# Patient Record
Sex: FEMALE | Race: WHITE | NOT HISPANIC OR LATINO
[De-identification: names, ages, dates, MRNs, and addresses within clinical notes are randomized per-mention and may not be internally consistent; named-entity substitution may affect disease eponyms.]

---

## 2020-07-16 PROBLEM — Z00.00 ENCOUNTER FOR PREVENTIVE HEALTH EXAMINATION: Status: ACTIVE | Noted: 2020-07-16

## 2020-07-30 ENCOUNTER — APPOINTMENT (OUTPATIENT)
Dept: OPHTHALMOLOGY | Facility: CLINIC | Age: 75
End: 2020-07-30
Payer: MEDICARE

## 2020-07-30 ENCOUNTER — NON-APPOINTMENT (OUTPATIENT)
Age: 75
End: 2020-07-30

## 2020-07-30 PROCEDURE — 92004 COMPRE OPH EXAM NEW PT 1/>: CPT

## 2020-09-10 ENCOUNTER — APPOINTMENT (OUTPATIENT)
Dept: OPHTHALMOLOGY | Facility: CLINIC | Age: 75
End: 2020-09-10
Payer: MEDICARE

## 2020-09-10 ENCOUNTER — NON-APPOINTMENT (OUTPATIENT)
Age: 75
End: 2020-09-10

## 2020-09-10 PROCEDURE — 92012 INTRM OPH EXAM EST PATIENT: CPT

## 2021-05-27 ENCOUNTER — APPOINTMENT (OUTPATIENT)
Dept: OPHTHALMOLOGY | Facility: CLINIC | Age: 76
End: 2021-05-27
Payer: MEDICARE

## 2021-05-27 ENCOUNTER — NON-APPOINTMENT (OUTPATIENT)
Age: 76
End: 2021-05-27

## 2021-05-27 PROCEDURE — 92012 INTRM OPH EXAM EST PATIENT: CPT

## 2021-07-12 ENCOUNTER — NON-APPOINTMENT (OUTPATIENT)
Age: 76
End: 2021-07-12

## 2021-07-12 ENCOUNTER — APPOINTMENT (OUTPATIENT)
Dept: OPHTHALMOLOGY | Facility: CLINIC | Age: 76
End: 2021-07-12
Payer: MEDICARE

## 2021-07-12 PROCEDURE — 92083 EXTENDED VISUAL FIELD XM: CPT

## 2021-07-12 PROCEDURE — 92134 CPTRZ OPH DX IMG PST SGM RTA: CPT

## 2021-07-12 PROCEDURE — 92014 COMPRE OPH EXAM EST PT 1/>: CPT

## 2022-02-22 ENCOUNTER — NON-APPOINTMENT (OUTPATIENT)
Age: 77
End: 2022-02-22

## 2022-02-22 ENCOUNTER — APPOINTMENT (OUTPATIENT)
Dept: OPHTHALMOLOGY | Facility: CLINIC | Age: 77
End: 2022-02-22
Payer: MEDICARE

## 2022-02-22 PROCEDURE — 92012 INTRM OPH EXAM EST PATIENT: CPT

## 2022-03-14 ENCOUNTER — NON-APPOINTMENT (OUTPATIENT)
Age: 77
End: 2022-03-14

## 2022-03-14 ENCOUNTER — APPOINTMENT (OUTPATIENT)
Dept: OPHTHALMOLOGY | Facility: CLINIC | Age: 77
End: 2022-03-14
Payer: MEDICARE

## 2022-03-14 PROCEDURE — 92012 INTRM OPH EXAM EST PATIENT: CPT | Mod: 25

## 2022-03-14 PROCEDURE — 68761 CLOSE TEAR DUCT OPENING: CPT | Mod: E2,E4

## 2022-04-18 ENCOUNTER — APPOINTMENT (OUTPATIENT)
Dept: OPHTHALMOLOGY | Facility: CLINIC | Age: 77
End: 2022-04-18
Payer: MEDICARE

## 2022-04-18 ENCOUNTER — NON-APPOINTMENT (OUTPATIENT)
Age: 77
End: 2022-04-18

## 2022-04-18 PROCEDURE — 92012 INTRM OPH EXAM EST PATIENT: CPT

## 2022-07-19 ENCOUNTER — APPOINTMENT (OUTPATIENT)
Dept: OPHTHALMOLOGY | Facility: CLINIC | Age: 77
End: 2022-07-19

## 2022-07-19 ENCOUNTER — NON-APPOINTMENT (OUTPATIENT)
Age: 77
End: 2022-07-19

## 2022-07-19 PROCEDURE — 92134 CPTRZ OPH DX IMG PST SGM RTA: CPT

## 2022-07-19 PROCEDURE — 92014 COMPRE OPH EXAM EST PT 1/>: CPT

## 2022-07-19 PROCEDURE — 92083 EXTENDED VISUAL FIELD XM: CPT

## 2022-09-01 ENCOUNTER — APPOINTMENT (OUTPATIENT)
Dept: OPHTHALMOLOGY | Facility: CLINIC | Age: 77
End: 2022-09-01

## 2022-09-01 ENCOUNTER — NON-APPOINTMENT (OUTPATIENT)
Age: 77
End: 2022-09-01

## 2022-09-01 PROCEDURE — 68761 CLOSE TEAR DUCT OPENING: CPT | Mod: E2,E4

## 2022-09-01 PROCEDURE — 92012 INTRM OPH EXAM EST PATIENT: CPT | Mod: 25

## 2022-09-19 ENCOUNTER — NON-APPOINTMENT (OUTPATIENT)
Age: 77
End: 2022-09-19

## 2022-09-19 ENCOUNTER — APPOINTMENT (OUTPATIENT)
Dept: OPHTHALMOLOGY | Facility: CLINIC | Age: 77
End: 2022-09-19

## 2022-09-19 PROCEDURE — 92012 INTRM OPH EXAM EST PATIENT: CPT

## 2022-09-22 ENCOUNTER — NON-APPOINTMENT (OUTPATIENT)
Age: 77
End: 2022-09-22

## 2022-09-22 ENCOUNTER — APPOINTMENT (OUTPATIENT)
Dept: OPHTHALMOLOGY | Facility: CLINIC | Age: 77
End: 2022-09-22

## 2022-09-22 DIAGNOSIS — H04.552 ACQUIRED STENOSIS OF LEFT NASOLACRIMAL DUCT: ICD-10-CM

## 2022-09-22 PROCEDURE — 68810 PROBE NASOLACRIMAL DUCT: CPT | Mod: LT

## 2022-09-29 LAB — BACTERIA EYE AEROBE CULT: ABNORMAL

## 2022-11-03 ENCOUNTER — APPOINTMENT (OUTPATIENT)
Dept: RHEUMATOLOGY | Facility: CLINIC | Age: 77
End: 2022-11-03

## 2022-12-09 ENCOUNTER — APPOINTMENT (OUTPATIENT)
Dept: RHEUMATOLOGY | Facility: CLINIC | Age: 77
End: 2022-12-09

## 2023-07-20 ENCOUNTER — APPOINTMENT (OUTPATIENT)
Dept: OPHTHALMOLOGY | Facility: CLINIC | Age: 78
End: 2023-07-20
Payer: MEDICARE

## 2023-07-20 ENCOUNTER — NON-APPOINTMENT (OUTPATIENT)
Age: 78
End: 2023-07-20

## 2023-07-20 PROCEDURE — 92083 EXTENDED VISUAL FIELD XM: CPT

## 2023-07-20 PROCEDURE — 92014 COMPRE OPH EXAM EST PT 1/>: CPT

## 2023-07-20 PROCEDURE — 92134 CPTRZ OPH DX IMG PST SGM RTA: CPT

## 2024-04-09 ENCOUNTER — APPOINTMENT (OUTPATIENT)
Dept: RHEUMATOLOGY | Facility: CLINIC | Age: 79
End: 2024-04-09
Payer: MEDICARE

## 2024-04-09 VITALS
TEMPERATURE: 97.2 F | HEART RATE: 97 BPM | WEIGHT: 120 LBS | DIASTOLIC BLOOD PRESSURE: 117 MMHG | HEIGHT: 60 IN | BODY MASS INDEX: 23.56 KG/M2 | SYSTOLIC BLOOD PRESSURE: 180 MMHG | OXYGEN SATURATION: 93 %

## 2024-04-09 PROCEDURE — 99205 OFFICE O/P NEW HI 60 MIN: CPT

## 2024-04-09 RX ORDER — HYDROCHLOROTHIAZIDE 12.5 MG/1
TABLET ORAL
Refills: 0 | Status: ACTIVE | COMMUNITY

## 2024-04-09 RX ORDER — GABAPENTIN 600 MG/1
600 TABLET, COATED ORAL
Refills: 0 | Status: ACTIVE | COMMUNITY

## 2024-04-09 NOTE — ASSESSMENT
[FreeTextEntry1] : 78 y/o female referred to rheumatology for RA. Pt started to see Dr. Rodrigues in 3/2021 and diagnosed with Sjogren's. Pt was started on HCQ 200mg PO BID with resolution of her joint pains x 1 year. Pt was then diagnosed with RA by another doctor Dr. Ortega and started on MTX 5 tabs/week with folic acid with L hand pain resolution but R hand deformities continuing. Pt had CT chest with diagnosis of pulmonary fibrosis and advised to follow up with rheumatology. Pt was last seen by Dr. Ortega in 12/2023. Pt was seen by pulmonary with abnormal PFTs but normal 6 minute walk test. Last seen by ophthalmology 7/2023.  Patient has clear HX of RA based on pt's R hand deformities. Pt was recently diagnosed with ILD. 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 Either way, pt's MTX has been stopped. Given pt's new onset of R hand deformities and ILD, I believe pt needs more aggressive therapy than HCQ alone, which is what pt is on right now.  - Will obtain pt's labwork and CT chest results over the last 6 months (Obtained and added to history) - c/w HCQ 200mg PO BID. HCQ dosage is <5mg/kg/day or <400mg/day to minimize risk of retinal toxicity. Side effects of HCQ were discussed with the patient including but not limited to GI upset, retinal toxicity, QT prolongation, cytopenias, myopathy. Discussed the importance of yearly ophthalmology evaluation. - Discussed with patient about options for additional treatment including tocilizumab (given pt's RA as well as ILD since tocilizumab has been used for CTD-ILD in past), LEF, SSZ - Last ophthalmology follow up 7/2023. - Continue follow up with pulmonary for follow up of ILD with serial PFTs and 6MWT - Will contact pt about labwork (especially Hep B/C, TB) to see if pt needs additional labwork to be started on any DMARDs. Pt will also call me after reading about the medication options. RTC 3 months for follow up

## 2024-04-09 NOTE — HISTORY OF PRESENT ILLNESS
[FreeTextEntry1] : 80 y/o female referred to rheumatology for RA. Pt started to see Dr. Rodrigues in 3/2021 and diagnosed with Sjogren's. Pt was started on HCQ 200mg PO BID with resolution of her joint pains x 1 year. Pt was then diagnosed with RA by another doctor Dr. Ortega and started on MTX 5 tabs/week with folic acid with L hand pain resolution but R hand deformities continuing. Pt had CT chest with diagnosis of pulmonary fibrosis and advised to follow up with rheumatology. Pt was last seen by Dr. Ortega in 12/2023. Pt was seen by pulmonary with abnormal PFTs but normal 6 minute walk test. Last seen by ophthalmology 7/2023.  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 Mild chronic L4 compression fracture, unchanged.

## 2024-04-09 NOTE — PHYSICAL EXAM
[TextEntry] : GENERAL: Appears in no acute distress  HEENT: EOMI, PERRLA. No conjunctival erythema. Moist mucous membranes. No nasopharyngeal ulcers  NECK: Supple, no cervical lymphadenopathy, no thyromegaly  CARDIOVASCULAR: RRR. S1, S2 auscultated. No murmurs or rubs.  PULMONARY: Clear to auscultation b/l, no wheezes, rales, or crackles  ABDOMINAL: Soft, nontender, nondistended. Bowel sounds present. No organomegaly.  MSK:  No active synovitis, swelling, erythema, or warmth.  R hand ulnar deviation and MCP bony hypertrophy SKIN: No lesions or rashes  NEURO: No focal deficits PSYCH: AAOx3. Normal affect and thought process.

## 2024-05-10 ENCOUNTER — APPOINTMENT (OUTPATIENT)
Dept: RHEUMATOLOGY | Facility: CLINIC | Age: 79
End: 2024-05-10
Payer: MEDICARE

## 2024-05-10 VITALS
TEMPERATURE: 96.9 F | BODY MASS INDEX: 23.56 KG/M2 | OXYGEN SATURATION: 93 % | HEART RATE: 92 BPM | SYSTOLIC BLOOD PRESSURE: 170 MMHG | WEIGHT: 120 LBS | HEIGHT: 60 IN | DIASTOLIC BLOOD PRESSURE: 108 MMHG

## 2024-05-10 DIAGNOSIS — Z79.899 OTHER LONG TERM (CURRENT) DRUG THERAPY: ICD-10-CM

## 2024-05-10 DIAGNOSIS — M06.9 RHEUMATOID ARTHRITIS, UNSPECIFIED: ICD-10-CM

## 2024-05-10 DIAGNOSIS — J84.9 INTERSTITIAL PULMONARY DISEASE, UNSPECIFIED: ICD-10-CM

## 2024-05-10 PROCEDURE — 99214 OFFICE O/P EST MOD 30 MIN: CPT

## 2024-05-10 PROCEDURE — G2211 COMPLEX E/M VISIT ADD ON: CPT

## 2024-05-10 RX ORDER — TOCILIZUMAB 180 MG/ML
162 INJECTION, SOLUTION SUBCUTANEOUS
Qty: 2 | Refills: 5 | Status: ACTIVE | COMMUNITY
Start: 2024-05-10

## 2024-05-10 NOTE — HISTORY OF PRESENT ILLNESS
[FreeTextEntry1] : HISTORY:  80 y/o female presents as follow up for RA. Pt started to see Dr. Rodrigues in 3/2021 and diagnosed with Sjogren's. Pt was started on HCQ 200mg PO BID with resolution of her joint pains x 1 year.  Pt was then diagnosed with RA by another doctor Dr. Ortega and started on MTX 5 tabs/week with folic acid with L hand pain resolution but R hand deformities continuing.  Pt had CT chest with diagnosis of pulmonary fibrosis and advised to follow up with rheumatology.  Pt was last seen by Dr. Ortega in 12/2023. Pt was seen by pulmonary with abnormal PFTs but normal 6 minute walk test.  Last seen by ophthalmology 7/2023.   Patient has clear Hx of RA based on pt's R hand deformities.  Pt was recently diagnosed with ILD. 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 Either way, pt's MTX has been stopped.  Given pt's new onset of R hand deformities and ILD, I believe pt needs more aggressive therapy than HCQ alone, which is what pt is on right now.   INTERVAL HISTORY:  Pt had labwork done with negative safety labwork. Pt discussed with cardiology about tocilizumab and he has positive thoughts on the medication on patients with RA and ILD. Pt has not had chance to discuss with pulmonologist about starting tocilizumab yet.  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 Mild chronic L4 compression fracture, unchanged.

## 2024-05-10 NOTE — ASSESSMENT
[FreeTextEntry1] : 80 y/o female presents as follow up for RA. Pt started to see Dr. Rodrigues in 3/2021 and diagnosed with Sjogren's. Pt was started on HCQ 200mg PO BID with resolution of her joint pains x 1 year.  Pt was then diagnosed with RA by another doctor Dr. Ortega and started on MTX 5 tabs/week with folic acid with L hand pain resolution but R hand deformities continuing.  Pt had CT chest with diagnosis of pulmonary fibrosis and advised to follow up with rheumatology.  Pt was last seen by Dr. Ortega in 12/2023. Pt was seen by pulmonary with abnormal PFTs but normal 6 minute walk test.  Last seen by ophthalmology 7/2023.   Patient has clear Hx of RA based on pt's R hand deformities.  Pt was recently diagnosed with ILD. 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 Either way, pt's MTX has been stopped.  Given pt's new onset of R hand deformities and ILD, I believe pt needs more aggressive therapy than HCQ alone, which is what pt is on right now.   Pt discussed with cardiology about tocilizumab and he has positive thoughts on the medication on patients with RA and ILD. Pt has not had chance to discuss with pulmonologist about starting tocilizumab yet.  - Rx tocilizumab 162mg SQ [id number]. Side effects of tocilizumab were discussed with the pt in detail including increased risk of infection, GI perforation, hepatotoxicity, neutropenia, thrombocytopenia. Advised pt to seek medical care ASAP if he/she has an infection and advised to stop the medication until infection resolves. This is a high-risk therapy requiring intense monitoring for toxicity. Will evaluate with frequent monitoring of CBC, LFTs, lipid panel. - Tocilizumab is preferred given pt's RA as well as ILD since tocilizumab has been used for CTD-ILD in past - Pen injector was ordered since patient is unable to maneuver syringe due to hand pains. - c/w HCQ 200mg PO BID. HCQ dosage is <5mg/kg/day or <400mg/day to minimize risk of retinal toxicity.  Side effects of HCQ were discussed with the patient including but not limited to GI upset, retinal toxicity, QT prolongation, cytopenias, myopathy. Discussed the importance of yearly ophthalmology evaluation.  - Last ophthalmology follow up 7/2023.  - Continue follow up with pulmonary for follow up of ILD with serial PFTs and 6MWT  - Last Hep B/C, QTB negative 5/2024. - RTC 3 months for follow up.

## 2024-05-16 ENCOUNTER — APPOINTMENT (OUTPATIENT)
Dept: INTERNAL MEDICINE | Facility: CLINIC | Age: 79
End: 2024-05-16

## 2024-05-16 ENCOUNTER — NON-APPOINTMENT (OUTPATIENT)
Age: 79
End: 2024-05-16

## 2024-05-16 ENCOUNTER — OUTPATIENT (OUTPATIENT)
Dept: OUTPATIENT SERVICES | Facility: HOSPITAL | Age: 79
LOS: 1 days | End: 2024-05-16

## 2024-07-18 ENCOUNTER — APPOINTMENT (OUTPATIENT)
Dept: OPHTHALMOLOGY | Facility: CLINIC | Age: 79
End: 2024-07-18
Payer: MEDICARE

## 2024-07-18 ENCOUNTER — NON-APPOINTMENT (OUTPATIENT)
Age: 79
End: 2024-07-18

## 2024-07-18 PROCEDURE — 92014 COMPRE OPH EXAM EST PT 1/>: CPT

## 2024-07-18 PROCEDURE — 92134 CPTRZ OPH DX IMG PST SGM RTA: CPT

## 2024-07-18 PROCEDURE — 92083 EXTENDED VISUAL FIELD XM: CPT

## 2024-08-09 ENCOUNTER — APPOINTMENT (OUTPATIENT)
Dept: RHEUMATOLOGY | Facility: CLINIC | Age: 79
End: 2024-08-09

## 2024-08-09 PROCEDURE — 99214 OFFICE O/P EST MOD 30 MIN: CPT

## 2024-08-09 PROCEDURE — G2211 COMPLEX E/M VISIT ADD ON: CPT

## 2024-08-09 NOTE — HISTORY OF PRESENT ILLNESS
[FreeTextEntry1] : HISTORY:  80 y/o female presents as follow up for RA.  Pt started to see Dr. Rodrigues in 3/2021 and diagnosed with Sjogren's. Pt was started on HCQ 200mg PO BID with resolution of her joint pains x 1 year.  Pt was then diagnosed with RA by another doctor Dr. Ortega and started on MTX 5 tabs/week with folic acid with L hand pain resolution but R hand deformities continuing.  Pt had CT chest with diagnosis of pulmonary fibrosis and advised to follow up with rheumatology.  Pt was last seen by Dr. Ortega in 12/2023. Pt was seen by pulmonary with abnormal PFTs but normal 6 minute walk test.  Last seen by ophthalmology 7/2023.   Patient has clear Hx of RA based on pt's R hand deformities.  Pt was recently diagnosed with ILD. 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 Either way, pt's MTX has been stopped.  Given pt's new onset of R hand deformities and ILD, I believe pt needs more aggressive therapy than HCQ alone, which is what pt is on right now.   Pt discussed with cardiology about tocilizumab and he has positive thoughts on the medication on patients with RA and ILD.   INTERVAL HISTORY:  Pt has been on tocilizumab since 6/2024 (~1.5 months). Denies any side effects. Denies any effects since pt does not have any major joint pains in the first place. Pt continues on HCQ. Pt is now being followed by a different pulmonologist Dr. Brigido Shukla in [facility]. He does not believe tocilizumab will help much for RA-ILD.   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 Mild chronic L4 compression fracture, unchanged.

## 2024-08-09 NOTE — ASSESSMENT
[FreeTextEntry1] : 78 y/o female presents as follow up for RA (RF+, CCP+).  Pt started to see Dr. Rodrigues in 3/2021 and diagnosed with Sjogren's. Pt was started on HCQ 200mg PO BID with resolution of her joint pains x 1 year.  Pt was then diagnosed with RA by another doctor Dr. Ortega and started on MTX 5 tabs/week with folic acid with L hand pain resolution but R hand deformities continuing.  Pt had CT chest with diagnosis of pulmonary fibrosis and advised to follow up with rheumatology.  Pt was last seen by Dr. Ortega in 12/2023. Pt was seen by pulmonary with abnormal PFTs but normal 6 minute walk test.  Last seen by ophthalmology 7/2023.   Patient has clear Hx of RA based on pt's R hand deformities, RF+, CCP+.  Pt was recently diagnosed with ILD. 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 Either way, pt's MTX has been stopped.  Given pt's new onset of R hand deformities and ILD, I believe pt needs more aggressive therapy than HCQ alone, which is what pt is on right now. Pt was started on tocilizumab (since it is used for RA as well as some CTD-ILD although not specifically for RA-ILD) by me in 6/2024 without any side effects. Denies any effects since pt does not have any major joint pains in the first place. Pt continues on HCQ.  Pt is now being followed by a different pulmonologist Dr. Brigido Shukla in [facility]. He does not believe tocilizumab will help much for RA-ILD.  - Obtain med safety labs - c/w tocilizumab 162mg SQ [id number]. Side effects of tocilizumab were discussed with the pt in detail including increased risk of infection, GI perforation, hepatotoxicity, neutropenia, thrombocytopenia. Advised pt to seek medical care ASAP if he/she has an infection and advised to stop the medication until infection resolves.  This is a high-risk therapy requiring intense monitoring for toxicity. Will evaluate with frequent monitoring of CBC, LFTs, lipid panel.  - c/w HCQ 200mg PO BID. HCQ dosage is <5mg/kg/day or <400mg/day to minimize risk of retinal toxicity.  Side effects of HCQ were discussed with the patient including but not limited to GI upset, retinal toxicity, QT prolongation, cytopenias, myopathy. Discussed the importance of yearly ophthalmology evaluation.  - Continue regular follow up with ophthalmology. - Continue follow up with pulmonary for follow up of ILD with serial PFTs and 6MWT, CT Chest as appropriate - Last Hep B/C, QTB negative 5/2024.  - RTC 3 months for follow up.

## 2024-11-01 ENCOUNTER — APPOINTMENT (OUTPATIENT)
Dept: PODIATRY | Facility: CLINIC | Age: 79
End: 2024-11-01

## 2024-11-01 DIAGNOSIS — M06.9 RHEUMATOID ARTHRITIS, UNSPECIFIED: ICD-10-CM

## 2024-11-01 DIAGNOSIS — L60.0 INGROWING NAIL: ICD-10-CM

## 2024-11-01 PROCEDURE — 11730 AVULSION NAIL PLATE SIMPLE 1: CPT | Mod: T5

## 2024-11-01 PROCEDURE — 99203 OFFICE O/P NEW LOW 30 MIN: CPT | Mod: 25

## 2024-11-02 PROBLEM — L60.0 INGROWN TOENAIL: Status: ACTIVE | Noted: 2024-11-02

## 2024-11-13 ENCOUNTER — APPOINTMENT (OUTPATIENT)
Dept: RHEUMATOLOGY | Facility: CLINIC | Age: 79
End: 2024-11-13
Payer: MEDICARE

## 2024-11-13 VITALS — OXYGEN SATURATION: 90 % | SYSTOLIC BLOOD PRESSURE: 147 MMHG | HEART RATE: 99 BPM | DIASTOLIC BLOOD PRESSURE: 84 MMHG

## 2024-11-13 DIAGNOSIS — Z79.899 OTHER LONG TERM (CURRENT) DRUG THERAPY: ICD-10-CM

## 2024-11-13 DIAGNOSIS — J84.9 INTERSTITIAL PULMONARY DISEASE, UNSPECIFIED: ICD-10-CM

## 2024-11-13 DIAGNOSIS — M06.9 RHEUMATOID ARTHRITIS, UNSPECIFIED: ICD-10-CM

## 2024-11-13 PROCEDURE — 99214 OFFICE O/P EST MOD 30 MIN: CPT

## 2024-11-13 PROCEDURE — G2211 COMPLEX E/M VISIT ADD ON: CPT

## 2024-11-13 RX ORDER — HYDROXYCHLOROQUINE SULFATE 200 MG/1
200 TABLET, FILM COATED ORAL
Qty: 180 | Refills: 1 | Status: ACTIVE | COMMUNITY
Start: 2024-11-13 | End: 1900-01-01

## 2025-02-13 ENCOUNTER — APPOINTMENT (OUTPATIENT)
Dept: RHEUMATOLOGY | Facility: CLINIC | Age: 80
End: 2025-02-13
Payer: MEDICARE

## 2025-02-13 VITALS
HEIGHT: 60 IN | HEART RATE: 90 BPM | DIASTOLIC BLOOD PRESSURE: 97 MMHG | BODY MASS INDEX: 23.56 KG/M2 | OXYGEN SATURATION: 93 % | SYSTOLIC BLOOD PRESSURE: 165 MMHG | WEIGHT: 120 LBS | TEMPERATURE: 97.1 F

## 2025-02-13 DIAGNOSIS — M06.9 RHEUMATOID ARTHRITIS, UNSPECIFIED: ICD-10-CM

## 2025-02-13 DIAGNOSIS — Z79.899 OTHER LONG TERM (CURRENT) DRUG THERAPY: ICD-10-CM

## 2025-02-13 DIAGNOSIS — J84.9 INTERSTITIAL PULMONARY DISEASE, UNSPECIFIED: ICD-10-CM

## 2025-02-13 PROCEDURE — G2211 COMPLEX E/M VISIT ADD ON: CPT

## 2025-02-13 PROCEDURE — 99214 OFFICE O/P EST MOD 30 MIN: CPT

## 2025-05-12 ENCOUNTER — APPOINTMENT (OUTPATIENT)
Dept: RHEUMATOLOGY | Facility: CLINIC | Age: 80
End: 2025-05-12
Payer: MEDICARE

## 2025-05-12 VITALS
SYSTOLIC BLOOD PRESSURE: 162 MMHG | WEIGHT: 120 LBS | DIASTOLIC BLOOD PRESSURE: 83 MMHG | HEART RATE: 92 BPM | OXYGEN SATURATION: 97 % | HEIGHT: 60 IN | BODY MASS INDEX: 23.56 KG/M2

## 2025-05-12 DIAGNOSIS — M06.9 RHEUMATOID ARTHRITIS, UNSPECIFIED: ICD-10-CM

## 2025-05-12 DIAGNOSIS — Z79.899 OTHER LONG TERM (CURRENT) DRUG THERAPY: ICD-10-CM

## 2025-05-12 DIAGNOSIS — J84.9 INTERSTITIAL PULMONARY DISEASE, UNSPECIFIED: ICD-10-CM

## 2025-05-12 PROCEDURE — G2211 COMPLEX E/M VISIT ADD ON: CPT

## 2025-05-12 PROCEDURE — 99214 OFFICE O/P EST MOD 30 MIN: CPT

## 2025-07-14 ENCOUNTER — APPOINTMENT (OUTPATIENT)
Dept: OPHTHALMOLOGY | Facility: CLINIC | Age: 80
End: 2025-07-14
Payer: MEDICARE

## 2025-07-14 ENCOUNTER — NON-APPOINTMENT (OUTPATIENT)
Age: 80
End: 2025-07-14

## 2025-07-14 PROCEDURE — 92014 COMPRE OPH EXAM EST PT 1/>: CPT

## 2025-07-14 PROCEDURE — 92083 EXTENDED VISUAL FIELD XM: CPT

## 2025-07-14 PROCEDURE — 92134 CPTRZ OPH DX IMG PST SGM RTA: CPT

## 2025-07-31 ENCOUNTER — RX RENEWAL (OUTPATIENT)
Age: 80
End: 2025-07-31

## 2025-09-09 ENCOUNTER — APPOINTMENT (OUTPATIENT)
Dept: INTERNAL MEDICINE | Facility: CLINIC | Age: 80
End: 2025-09-09

## 2025-09-09 ENCOUNTER — NON-APPOINTMENT (OUTPATIENT)
Age: 80
End: 2025-09-09